# Patient Record
Sex: MALE | Race: WHITE | NOT HISPANIC OR LATINO | Employment: UNEMPLOYED | URBAN - METROPOLITAN AREA
[De-identification: names, ages, dates, MRNs, and addresses within clinical notes are randomized per-mention and may not be internally consistent; named-entity substitution may affect disease eponyms.]

---

## 2017-07-12 ENCOUNTER — HOSPITAL ENCOUNTER (EMERGENCY)
Facility: MEDICAL CENTER | Age: 62
End: 2017-07-12
Attending: EMERGENCY MEDICINE

## 2017-07-12 ENCOUNTER — APPOINTMENT (OUTPATIENT)
Dept: RADIOLOGY | Facility: MEDICAL CENTER | Age: 62
End: 2017-07-12
Attending: EMERGENCY MEDICINE

## 2017-07-12 VITALS
HEART RATE: 72 BPM | BODY MASS INDEX: 23.7 KG/M2 | SYSTOLIC BLOOD PRESSURE: 132 MMHG | RESPIRATION RATE: 20 BRPM | WEIGHT: 175 LBS | DIASTOLIC BLOOD PRESSURE: 81 MMHG | TEMPERATURE: 99.7 F | HEIGHT: 72 IN | OXYGEN SATURATION: 100 %

## 2017-07-12 DIAGNOSIS — J45.21 MILD INTERMITTENT ASTHMA WITH ACUTE EXACERBATION: ICD-10-CM

## 2017-07-12 LAB
ALBUMIN SERPL BCP-MCNC: 4.1 G/DL (ref 3.2–4.9)
ALBUMIN/GLOB SERPL: 2 G/DL
ALP SERPL-CCNC: 109 U/L (ref 30–99)
ALT SERPL-CCNC: 13 U/L (ref 2–50)
ANION GAP SERPL CALC-SCNC: 12 MMOL/L (ref 0–11.9)
AST SERPL-CCNC: 14 U/L (ref 12–45)
BASOPHILS # BLD AUTO: 0.6 % (ref 0–1.8)
BASOPHILS # BLD: 0.05 K/UL (ref 0–0.12)
BILIRUB SERPL-MCNC: 0.6 MG/DL (ref 0.1–1.5)
BUN SERPL-MCNC: 19 MG/DL (ref 8–22)
CALCIUM SERPL-MCNC: 9 MG/DL (ref 8.5–10.5)
CHLORIDE SERPL-SCNC: 108 MMOL/L (ref 96–112)
CO2 SERPL-SCNC: 19 MMOL/L (ref 20–33)
CREAT SERPL-MCNC: 0.88 MG/DL (ref 0.5–1.4)
EKG IMPRESSION: NORMAL
EOSINOPHIL # BLD AUTO: 0.55 K/UL (ref 0–0.51)
EOSINOPHIL NFR BLD: 6.5 % (ref 0–6.9)
ERYTHROCYTE [DISTWIDTH] IN BLOOD BY AUTOMATED COUNT: 38.9 FL (ref 35.9–50)
GFR SERPL CREATININE-BSD FRML MDRD: >60 ML/MIN/1.73 M 2
GLOBULIN SER CALC-MCNC: 2.1 G/DL (ref 1.9–3.5)
GLUCOSE SERPL-MCNC: 120 MG/DL (ref 65–99)
HCT VFR BLD AUTO: 40.5 % (ref 42–52)
HGB BLD-MCNC: 14 G/DL (ref 14–18)
IMM GRANULOCYTES # BLD AUTO: 0.02 K/UL (ref 0–0.11)
IMM GRANULOCYTES NFR BLD AUTO: 0.2 % (ref 0–0.9)
LACTATE BLD-SCNC: 1.7 MMOL/L (ref 0.5–2)
LYMPHOCYTES # BLD AUTO: 1.67 K/UL (ref 1–4.8)
LYMPHOCYTES NFR BLD: 19.8 % (ref 22–41)
MCH RBC QN AUTO: 29.9 PG (ref 27–33)
MCHC RBC AUTO-ENTMCNC: 34.6 G/DL (ref 33.7–35.3)
MCV RBC AUTO: 86.5 FL (ref 81.4–97.8)
MONOCYTES # BLD AUTO: 0.79 K/UL (ref 0–0.85)
MONOCYTES NFR BLD AUTO: 9.4 % (ref 0–13.4)
NEUTROPHILS # BLD AUTO: 5.34 K/UL (ref 1.82–7.42)
NEUTROPHILS NFR BLD: 63.5 % (ref 44–72)
NRBC # BLD AUTO: 0 K/UL
NRBC BLD AUTO-RTO: 0 /100 WBC
PLATELET # BLD AUTO: 253 K/UL (ref 164–446)
PMV BLD AUTO: 9.7 FL (ref 9–12.9)
POTASSIUM SERPL-SCNC: 3.5 MMOL/L (ref 3.6–5.5)
PROT SERPL-MCNC: 6.2 G/DL (ref 6–8.2)
RBC # BLD AUTO: 4.68 M/UL (ref 4.7–6.1)
SODIUM SERPL-SCNC: 139 MMOL/L (ref 135–145)
TROPONIN I SERPL-MCNC: <0.01 NG/ML (ref 0–0.04)
WBC # BLD AUTO: 8.4 K/UL (ref 4.8–10.8)

## 2017-07-12 PROCEDURE — 71010 DX-CHEST-PORTABLE (1 VIEW): CPT

## 2017-07-12 PROCEDURE — 93005 ELECTROCARDIOGRAM TRACING: CPT

## 2017-07-12 PROCEDURE — 94640 AIRWAY INHALATION TREATMENT: CPT

## 2017-07-12 PROCEDURE — 84484 ASSAY OF TROPONIN QUANT: CPT

## 2017-07-12 PROCEDURE — 700111 HCHG RX REV CODE 636 W/ 250 OVERRIDE (IP): Performed by: EMERGENCY MEDICINE

## 2017-07-12 PROCEDURE — 83605 ASSAY OF LACTIC ACID: CPT

## 2017-07-12 PROCEDURE — 99284 EMERGENCY DEPT VISIT MOD MDM: CPT

## 2017-07-12 PROCEDURE — 80053 COMPREHEN METABOLIC PANEL: CPT

## 2017-07-12 PROCEDURE — 96374 THER/PROPH/DIAG INJ IV PUSH: CPT

## 2017-07-12 PROCEDURE — 93005 ELECTROCARDIOGRAM TRACING: CPT | Performed by: EMERGENCY MEDICINE

## 2017-07-12 PROCEDURE — 700101 HCHG RX REV CODE 250: Performed by: EMERGENCY MEDICINE

## 2017-07-12 PROCEDURE — 85025 COMPLETE CBC W/AUTO DIFF WBC: CPT

## 2017-07-12 RX ORDER — PREDNISONE 20 MG/1
60 TABLET ORAL DAILY
Qty: 12 TAB | Refills: 0 | Status: SHIPPED | OUTPATIENT
Start: 2017-07-12 | End: 2017-07-16

## 2017-07-12 RX ORDER — LEVALBUTEROL INHALATION SOLUTION 1.25 MG/3ML
1.25 SOLUTION RESPIRATORY (INHALATION)
Status: COMPLETED | OUTPATIENT
Start: 2017-07-12 | End: 2017-07-12

## 2017-07-12 RX ORDER — ALBUTEROL SULFATE 90 UG/1
2 AEROSOL, METERED RESPIRATORY (INHALATION) EVERY 6 HOURS PRN
Qty: 8.5 G | Refills: 0 | Status: SHIPPED | OUTPATIENT
Start: 2017-07-12

## 2017-07-12 RX ORDER — METHYLPREDNISOLONE SODIUM SUCCINATE 125 MG/2ML
125 INJECTION, POWDER, LYOPHILIZED, FOR SOLUTION INTRAMUSCULAR; INTRAVENOUS ONCE
Status: COMPLETED | OUTPATIENT
Start: 2017-07-12 | End: 2017-07-12

## 2017-07-12 RX ADMIN — METHYLPREDNISOLONE SODIUM SUCCINATE 125 MG: 125 INJECTION, POWDER, FOR SOLUTION INTRAMUSCULAR; INTRAVENOUS at 19:06

## 2017-07-12 RX ADMIN — LEVALBUTEROL 1.25 MG: 1.25 SOLUTION RESPIRATORY (INHALATION) at 19:27

## 2017-07-12 ASSESSMENT — PAIN SCALES - GENERAL: PAINLEVEL_OUTOF10: 0

## 2017-07-12 NOTE — ED AVS SNAPSHOT
7/12/2017    Abilio Helm  [address] [place] [address]    Dear Abilio:    [facility] wants to ensure your discharge home is safe and you or your loved ones have had all of your questions answered regarding your care after you leave the hospital.    Below is a list of resources and contact information should you have any questions regarding your hospital stay, follow-up instructions, or active medical symptoms.    Questions or Concerns Regarding… Contact   Medical Questions Related to Your Discharge  (7 days a week, 8am-5pm) Contact a Nurse Care Coordinator   [phone]   Medical Questions Not Related to Your Discharge  (24 hours a day / 7 days a week)  Contact the Nurse Health Line   [phone]    Medications or Discharge Instructions Refer to your discharge packet   or contact your [facility] Primary Care Provider   [phone]   Follow-up Appointment(s) Schedule your appointment via [employer]   or contact Scheduling [phone]   Billing Review your statement via Should[employer] Options  or contact Billing [phone]   Medical Records Review your records via Should[employer] Options   or contact Medical Records [phone]     You may receive a telephone call within two days of discharge. This call is to make certain you understand your discharge instructions and have the opportunity to have any questions answered. You can also easily access your medical information, test results and upcoming appointments via the Shoulder Options free online health management tool. You can learn more and sign up at [employer]/Shoulder Options. For assistance setting up your Shoulder Options account, please call [phone].    Once again, we want to ensure your discharge home is safe and that you have a clear understanding of any next steps in your care. If you have any questions or concerns, please do not hesitate to contact us, we are here for you. Thank you for choosing [facility] for your healthcare needs.    Sincerely,    Your [facility] Healthcare Team

## 2017-07-12 NOTE — ED AVS SNAPSHOT
BlueVox Access Code: [email or web address]  Expires: 8/11/2017  9:47 PM    Your email address is not on file at [employer].  Email Addresses are required for you to sign up for BlueVox, please contact [phone] to verify your personal information and to provide your email address prior to attempting to register for BlueVox.    Abilio Helm  [address] [place] [address]    mktgt  A secure, online tool to manage your health information     [employer]’s BlueVox® is a secure, online tool that connects you to your personalized health information from the privacy of your home -- day or night - making it very easy for you to manage your healthcare. Once the activation process is completed, you can even access your medical information using the BlueVox bess, which is available for free in the Apple Bess store or Google Play store.     To learn more about BlueVox, visit [email or web address]    There are two levels of access available (as shown below):   My Chart Features  [facility] Primary Care Doctor [facility]  Specialists [facility]  Urgent  Care Non-[facility] Primary Care Doctor   Email your healthcare team securely and privately 24/7 X X X    Manage appointments: schedule your next appointment; view details of past/upcoming appointments X      Request prescription refills. X      View recent personal medical records, including lab and immunizations X X X X   View health record, including health history, allergies, medications X X X X   Read reports about your outpatient visits, procedures, consult and ER notes X X X X   See your discharge summary, which is a recap of your hospital and/or ER visit that includes your diagnosis, lab results, and care plan X X  X     How to register for mktgt:  Once your e-mail address has been verified, follow the following steps to sign up for mktgt.     1. Go to  [email or web address]  2. Click on the Sign Up Now box, which takes you to the New Member Sign Up page. You will need to  provide the following information:  a. Enter your [employer] Access Code exactly as it appears at the top of this page. (You will not need to use this code after you’ve completed the sign-up process. If you do not sign up before the expiration date, you must request a new code.)   b. Enter your date of birth.   c. Enter your home email address.   d. Click Submit, and follow the next screen’s instructions.  3. Create a [employer] ID. This will be your [employer] login ID and cannot be changed, so think of one that is secure and easy to remember.  4. Create a [employer] password. You can change your password at any time.  5. Enter your Password Reset Question and Answer. This can be used at a later time if you forget your password.   6. Enter your e-mail address. This allows you to receive e-mail notifications when new information is available in [employer].  7. Click Sign Up. You can now view your health information.    For assistance activating your [employer] account, call [phone]

## 2017-07-12 NOTE — ED AVS SNAPSHOT
Home Care Instructions                                                                                                                Abilio Helm   MRN: [id number]    Department:  [facility], Emergency Dept   Date of Visit:  7/12/2017            [facility], Emergency Dept    [address]    [place] [address]    Phone:  [phone]      You were seen by     Aquiles Quezada M.D.      Your Diagnosis Was     Mild intermittent asthma with acute exacerbation     J45.21       These are the medications you received during your hospitalization from 07/12/2017 1839 to 07/12/2017 2148     Date/Time Order Dose Route Action    07/12/2017 1906 methylPREDNISolone sod succ (SOLU-MEDROL) 125 MG injection 125 mg 125 mg Intravenous Given    07/12/2017 1927 levalbuterol (XOPENEX) 1.25 MG/3ML nebulizer solution 1.25 mg 1.25 mg Nebulization Given      Follow-up Information     1. Follow up with [facility].    Contact information    [facility]  [place] [address]  [phone]        Medication Information     Review all of your home medications and newly ordered medications with your primary doctor and/or pharmacist as soon as possible. Follow medication instructions as directed by your doctor and/or pharmacist.     Please keep your complete medication list with you and share with your physician. Update the information when medications are discontinued, doses are changed, or new medications (including over-the-counter products) are added; and carry medication information at all times in the event of emergency situations.               Medication List      START taking these medications        Instructions    Morning Afternoon Evening Bedtime    albuterol 108 (90 BASE) MCG/ACT Aers inhalation aerosol        Doctor's comments:  Use with spacer   Inhale 2 Puffs by mouth every 6 hours as needed for Shortness of Breath.   Dose:  2 Puff                        predniSONE 20 MG Tabs   Commonly  known as:  DELTASONE        Take 3 Tabs by mouth every day for 4 days.   Dose:  60 mg                             Where to Get Your Medications      You can get these medications from any pharmacy     Bring a paper prescription for each of these medications    - albuterol 108 (90 BASE) MCG/ACT Aers inhalation aerosol  - predniSONE 20 MG Tabs            Procedures and tests performed during your visit     CARDIAC MONITORING    CBC WITH DIFFERENTIAL    COMP METABOLIC PANEL    DX-CHEST-PORTABLE (1 VIEW)    EKG (ER)    EKG (NOW)    ESTIMATED GFR    IV Saline Lock    Lactic acid (lactate)    OXYGEN THERAPY PER PROTOCOL    TROPONIN        Discharge Instructions       Asthma, F.L.A.R.E.  Most people with asthma do not get so sick that they need emergency care. The fact that you had to get emergency care may mean:  · You are not taking your long-term control medicine the right way.   · You have not been prescribed any or enough long-term control medicine.   · You are still exposed to triggers that start your asthma symptoms.   You can avoid asthma flare-ups by using this F.L.A.R.E. plan until you see your primary doctor.  F  FOLLOW UP WITH YOUR PRIMARY DOCTOR - CALL TO MAKE AN APPOINTMENT TO BE SEEN.  · If you have trouble making an appointment, ask to speak to the office nurse.   · If you do not have a primary care doctor, call to get one.   At the follow-up appointment:  · Bring all of your medications and this plan with you.   · Make an asthma action plan with your doctor that you can follow every day to keep your asthma under control.   · Write down your questions and your doctor's answers.   This will make your emergency visits rare.  L  LEARN ABOUT YOUR ASTHMA MEDICINES. TAKE ALL OF THESE MEDICINES JUST AS THE DOCTOR TELLS YOU, EVEN IF YOU ARE FEELING MUCH BETTER.  Quick-relief or Rescue  · Kind of medicine   · Name of medicine   · How much   · How often & how long you need to take it   Long-term control  · Kind of  "medicine   · Name of medicine   · How much   · How often & how long you need to take it   Steroid pills or syrup  · Kind of medicine   · Name of medicine   · How much   · How often & how long you need to take it   A  ASTHMA IS A LIFE-LONG (CHRONIC) DISEASE.  Even though your breathing is better after getting emergency care, you still need to get long-term control of your asthma. If you do not, you are at risk for more severe flare-ups and even death.  · If you use quick-relief medicine more than 2 times per week, your asthma is not under control. You need to see your doctor or an asthma specialist to make a plan to get control of your asthma.   · Take long-term control medicine every day as ordered by your doctor.   · Figure out what things make your asthma flare up and try to stay away from these \"triggers.\"   R  RESPOND TO THESE WARNING SIGNS THAT YOUR ASTHMA IS GETTING WORSE:  · Your chest feels tight.   · You are short of breath.   · You are wheezing.   · You are coughing.   · Your peak flow is getting low.   Keep taking your medicines as prescribed and call your doctor.  E  EMERGENCY CARE MAY BE NEEDED IF YOU:  · Have trouble talking.   · Are working hard to breathe (may see skin sucking in at rib cage or above breast bone).   · Need to use quick-relief medicine more often than every 4 hours.   · See your peak flow dropping.   Take your quick-relief medicine and wait 20 minutes. If you do not feel better, take it again and wait 20 minutes. If you still do not feel better, take it again and call your doctor or 911 right away!  LEARN ABOUT ASTHMA  Asthma is a life-long disease that can make it hard for you to get air in and out of your lungs. Your asthma triggers make the air tubes in your lungs get smaller. These are the tubes that carry air in and out of your lungs.  Here is what happens:  · Small breathing tubes in your lungs swell and make extra mucus.   · Muscles around the small breathing tubes get tight " and make them smaller.   · Smaller breathing tubes then get clogged with the extra mucus.   · Swelling, muscle tightness, and mucus make it harder for you to breathe. You start to cough and wheeze and your chest might feel tight.   Not all asthma flare-ups are the same. Some are worse than others. In a severe asthma flare-up, the breathing tubes get so small that air cannot get in and out of the lungs. People can die if their asthma flare-up is severe.  ASTHMA MEDICINES  · Quick-relief or Rescue medicine: should help for about 4 hours, relaxes the muscles around your breathing tubes so air can get in and out. If you need to take quick-relief medicine more than 2 times per week, your asthma is not under control, and you should ask your doctor about long-term control medicine.   · Long-term control medicine: must be taken every day in order to work right. It keeps your breathing tubes from swelling, and can prevent most asthma flare-ups. This medicine cannot stop a flare-up once it starts. During flare-ups, use quick-relief medicine right away and take your long-term control medicine as usual.   · Steroid pills or syrup: can help the swelling in your breathing tubes go away. You must take this medicine just as the doctor tells you to. Do not skip a dose, and do not stop taking it unless a doctor tells you to stop.   TRIGGERS: TELL YOUR DOCTOR ABOUT THE THINGS THAT MAKE YOUR ASTHMA WORSE.  What started or triggered your asthma flare-up this time?  COMMON ASTHMA TRIGGERS:  · Breathing in chemicals, dusts, or fumes at work.   · Colds or flu.   · Animals.   · Dust.   · Pollen and mold.   · Strong odors.   · Weather.   · Exercise.   · Cigarette and other smoke.   · Medicines.   Smoking and secondhand smoke are asthma triggers. If you smoke, choose to quit. Never let others smoke near you or your children. Call your doctor or your health plan for help quitting.  FOR MORE INFORMATION  Asthma Initiative of [place]:  www.GetAsthmaHelp.org  American Lung Association: www.lungusa.org  Asthma and Allergy Foundation of [place]: www.aafa.org  This plan and asthma information are based on the NAEPP Guidelines for the Diagnosis and Management of Asthma, (http://www.nhlbi.nig.gov/guidelines/asthma/asthgdln.htm).  Document Released: 07/26/2007 Document Revised: 03/11/2013 Document Reviewed: 10/04/2007  ExitCare® Patient Information ©2013 [employer].          Patient Information     Patient Information    Following emergency treatment: all patient requiring follow-up care must return either to a private physician or a clinic if your condition worsens before you are able to obtain further medical attention, please return to the emergency room.     Billing Information    At [facility], we work to make the billing process streamlined for our patients.  Our Representatives are here to answer any questions you may have regarding your hospital bill.  If you have insurance coverage and have supplied your insurance information to us, we will submit a claim to your insurer on your behalf.  Should you have any questions regarding your bill, we can be reached online or by phone as follows:  Online: You are able pay your bills online or live chat with our representatives about any billing questions you may have. We are here to help Monday - Friday from 8:00am to 7:30pm and 9:00am - 12:00pm on Saturdays.  Please visit [email or web address]  for more information.   Phone:  [phone] or [phone]    Please note that your emergency physician, surgeon, pathologist, radiologist, anesthesiologist, and other specialists are not employed by [facility] and will therefore bill separately for their services.  Please contact them directly for any questions concerning their bills at the numbers below:     Emergency Physician Services:  [phone]  [facility]:  [phone]  [facility]:   [phone]  [facility]:  [phone]    1. Your final bill may vary from the amount quoted upon discharge if all procedures are not complete at that time, or if your doctor has additional procedures of which we are not aware. You will receive an additional bill if you return to the Emergency Department at [facility] for suture removal regardless of the facility of which the sutures were placed.     2. Please arrange for settlement of this account at the emergency registration.    3. All self-pay accounts are due in full at the time of treatment.  If you are unable to meet this obligation then payment is expected within 4-5 days.     4. If you have had radiology studies (CT, X-ray, Ultrasound, MRI), you have received a preliminary result during your emergency department visit. Please contact the radiology department [phone] to receive a copy of your final result. Please discuss the Final result with your primary physician or with the follow up physician provided.     Crisis Hotline:  [place] Crisis Hotline:  7-857-EIWRNOQ or 1-238.224.1764  [place] Crisis Hotline:    1-337.700.6055 or 122-497-0834         ED Discharge Follow Up Questions    1. In order to provide you with very good care, we would like to follow up with a phone call in the next few days.  May we have your permission to contact you?     YES /  NO    2. What is the best phone number to call you? (       )_____-__________    3. What is the best time to call you?      Morning  /  Afternoon  /  Evening                   Patient Signature:  ____________________________________________________________    Date:  ____________________________________________________________

## 2017-07-13 ENCOUNTER — PATIENT OUTREACH (OUTPATIENT)
Dept: HEALTH INFORMATION MANAGEMENT | Facility: OTHER | Age: 62
End: 2017-07-13

## 2017-07-13 NOTE — PROGRESS NOTES
· Chart reviewed.  Patient was discharged from [facility] ER 7/12/17.  Patient does not have a contact phone number listed in Epic record.  Discharge outreach letter mailed to patient, providing resource phone numbers and instructions to call with any questions or concerns.

## 2017-07-13 NOTE — ED NOTES
Respiratory contacted for breathing tx, with a critical patient intubating will come after that to give breathing tx

## 2017-07-13 NOTE — ED PROVIDER NOTES
ED Provider Note    Scribed for Aquiles Quezada M.D. by Aida Saldana. 7/12/2017, 6:53 PM.    Primary care provider: No primary care provider.  Means of arrival: Ambulance   History obtained from: Patient  History limited by: None     CHIEF COMPLAINT  Chief Complaint   Patient presents with   • Shortness of Breath     pt with asthma attack due to smoke in air, states last attack was in mid 1980, states was walking around outside recently, wheezing noted throughout, ems gave one albuterol and one duoneb on arrival, roomair sat 85% recorded by fire dept, now 98% on        HPI  Abilio Helm is a 61 y.o. male who presents to the Emergency Department for shortness of breath onset yesterday and worsening this afternoon. He states that he has a history of asthma but has not had any problems with this since the 1980's. He does not take medications for asthma or any other medications. Per patient, his current symptoms feel similar to previous asthma attacks. The patient reports that he was walking outside in the smoke quite a bit in the past several days. He denies a known history of COPD. Patient denies fevers, chills, nausea, vomiting, diarrhea, chest pain. He states that he got laid off at [employer] a few months ago and has been [facility]/staying with friends.     REVIEW OF SYSTEMS  See HPI for further details. All other systems are negative. C    PAST MEDICAL HISTORY   has a past medical history of Asthma.    SURGICAL HISTORY   has past surgical history that includes other abdominal surgery.    SOCIAL HISTORY  Social History   Substance Use Topics   • Smoking status: Former Smoker   • Smokeless tobacco: None   • Alcohol Use: No      History   Drug Use No       FAMILY HISTORY  History reviewed. No pertinent family history.    CURRENT MEDICATIONS  Reviewed.  See Encounter Summary.     ALLERGIES  No Known Allergies    PHYSICAL EXAM  VITAL SIGNS: /81 mmHg  Pulse 103  Temp(Src) 37.6 °C (99.7 °F)   Resp 24  Ht 1.829 m (6')  Wt 79.379 kg (175 lb)  BMI 23.73 kg/m2  SpO2 99%    Constitutional: Alert in no apparent distress.  HENT: No signs of trauma, Bilateral external ears normal, Nose normal.   Eyes: Pupils are equal and reactive, Conjunctiva normal, Non-icteric.   Neck: Normal range of motion, No tenderness, Supple, No stridor.   Lymphatic: No lymphadenopathy noted.   Cardiovascular: Regular rate and rhythm, no murmurs.   Thorax & Lungs: Tachypneic. Wheezes throughout.   Abdomen: Slightly scaphoid. Bowel sounds normal, Soft, No tenderness, No masses, No pulsatile masses. No peritoneal signs.  Skin: Warm, Dry, No erythema, No rash.   Back: No bony tenderness, No CVA tenderness.   Extremities: Intact distal pulses, No edema, No tenderness, No cyanosis  Musculoskeletal: Good range of motion in all major joints. No tenderness to palpation or major deformities noted.   Neurologic: Alert, Normal motor function, Normal sensory function, No focal deficits noted.   Psychiatric: Affect normal, Judgment normal, Mood normal.     DIAGNOSTIC STUDIES / PROCEDURES     LABS  Results for orders placed or performed during the hospital encounter of 07/12/17   Lactic acid (lactate)   Result Value Ref Range    Lactic Acid 1.7 0.5 - 2.0 mmol/L   CBC WITH DIFFERENTIAL   Result Value Ref Range    WBC 8.4 4.8 - 10.8 K/uL    RBC 4.68 (L) 4.70 - 6.10 M/uL    Hemoglobin 14.0 14.0 - 18.0 g/dL    Hematocrit 40.5 (L) 42.0 - 52.0 %    MCV 86.5 81.4 - 97.8 fL    MCH 29.9 27.0 - 33.0 pg    MCHC 34.6 33.7 - 35.3 g/dL    RDW 38.9 35.9 - 50.0 fL    Platelet Count 253 164 - 446 K/uL    MPV 9.7 9.0 - 12.9 fL    Neutrophils-Polys 63.50 44.00 - 72.00 %    Lymphocytes 19.80 (L) 22.00 - 41.00 %    Monocytes 9.40 0.00 - 13.40 %    Eosinophils 6.50 0.00 - 6.90 %    Basophils 0.60 0.00 - 1.80 %    Immature Granulocytes 0.20 0.00 - 0.90 %    Nucleated RBC 0.00 /100 WBC    Neutrophils (Absolute) 5.34 1.82 - 7.42 K/uL    Lymphs (Absolute) 1.67 1.00 - 4.80  K/uL    Monos (Absolute) 0.79 0.00 - 0.85 K/uL    Eos (Absolute) 0.55 (H) 0.00 - 0.51 K/uL    Baso (Absolute) 0.05 0.00 - 0.12 K/uL    Immature Granulocytes (abs) 0.02 0.00 - 0.11 K/uL    NRBC (Absolute) 0.00 K/uL   COMP METABOLIC PANEL   Result Value Ref Range    Sodium 139 135 - 145 mmol/L    Potassium 3.5 (L) 3.6 - 5.5 mmol/L    Chloride 108 96 - 112 mmol/L    Co2 19 (L) 20 - 33 mmol/L    Anion Gap 12.0 (H) 0.0 - 11.9    Glucose 120 (H) 65 - 99 mg/dL    Bun 19 8 - 22 mg/dL    Creatinine 0.88 0.50 - 1.40 mg/dL    Calcium 9.0 8.5 - 10.5 mg/dL    AST(SGOT) 14 12 - 45 U/L    ALT(SGPT) 13 2 - 50 U/L    Alkaline Phosphatase 109 (H) 30 - 99 U/L    Total Bilirubin 0.6 0.1 - 1.5 mg/dL    Albumin 4.1 3.2 - 4.9 g/dL    Total Protein 6.2 6.0 - 8.2 g/dL    Globulin 2.1 1.9 - 3.5 g/dL    A-G Ratio 2.0 g/dL   ESTIMATED GFR   Result Value Ref Range    GFR If African American >60 >60 mL/min/1.73 m 2    GFR If Non African American >60 >60 mL/min/1.73 m 2   TROPONIN   Result Value Ref Range    Troponin I <0.01 0.00 - 0.04 ng/mL   EKG (ER)   Result Value Ref Range    Report       [facility] Emergency Dept.    Test Date:  2017  Pt Name:    MILES CARCAMO                Department: ER  MRN:        [id number]                      Room:        19  Gender:     M                            Technician: [id number]  :        1955                   Requested By:ER TRIAGE PROTOCOL  Order #:    [id number]                    Reading MD:    Measurements  Intervals                                Axis  Rate:       98                           P:          70  GA:         152                          QRS:        34  QRSD:       86                           T:          76  QT:         380  QTc:        486    Interpretive Statements  SINUS RHYTHM  RSR' IN V1 OR V2, PROBABLY NORMAL VARIANT  BORDERLINE T ABNORMALITIES, ANT-LAT LEADS  BORDERLINE PROLONGED QT INTERVAL  No previous ECG available for comparison     All labs were  reviewed by me.    EKG  12 Lead EKG interpreted by me to show:  Sinus rhythm  Rate 98  Axis: Borderline left axis deviation   QTc of 46  No acute ST-T wave changes    RADIOLOGY  DX-CHEST-PORTABLE (1 VIEW)   Final Result      Bilateral lung hyperinflation. Bleb formation in the mid left lung. No evidence for pneumonia or congestive heart failure.      The radiologist's interpretation of all radiological studies and images have been reviewed by me.    COURSE & MEDICAL DECISION MAKING  Pertinent Labs & Imaging studies reviewed. (See chart for details)      6:53 PM - Patient seen and examined at bedside. Patient will be treated with 125 mg Solu-Medrol IV, 1.25 mg Xopenex via nebulizer. Ordered for a chest XR, EKG, and labs to evaluate his symptoms. The plan of care was discussed with the patient and I answered all of his questions at this time. The patient understands and is agreeable with this plan of care.      9:04 PM Patient reevaluated at bedside. He reports feeling improved following interventions. The patient will be prescribed an albuterol inhaler. Discussed that he is to use this every 4-6 hours for the next several days. He will also be prescribed Deltasone. Discussed plan for discharge; I advised the patient to return to the [facility] ED with any new or worsening symptoms. Patient was given the opportunity for questions. I addressed all questions or concerns at this time and they verbalize agreement to the plan of care.     Decision Making:  This is a 61 y.o. year old male who presents with shortness of breath. Patient currently homeless. Living on the streets and in a homeless shelter. History of asthma. Likely asthma flare given his presentation today. Significant improvement with albuterol and Xopenex. We'll give him outpatient albuterol inhaler and ongoing steroids. He does have he needs to stop smoking. He is satting return precautions to ER if needed. He has been referred to local clinic for outpatient  follow-up.    The patient will return for new or worsening symptoms and is stable at the time of discharge.    The patient is referred to a primary physician for blood pressure management, diabetic screening, and for all other preventative health concerns.    DISPOSITION:  Patient will be discharged home in stable condition.    OUTPATIENT MEDICATIONS:  New Prescriptions    ALBUTEROL 108 (90 BASE) MCG/ACT AERO SOLN INHALATION AEROSOL    Inhale 2 Puffs by mouth every 6 hours as needed for Shortness of Breath.    PREDNISONE (DELTASONE) 20 MG TAB    Take 3 Tabs by mouth every day for 4 days.     FINAL IMPRESSION  1. Mild intermittent asthma with acute exacerbation       Aida Saldana (Scribe), am scribing for, and in the presence of, Aquiles Quezada M.D..    Electronically signed by: Aida Saldana ([employer]), 7/12/2017    IAquiles M.D. personally performed the services described in this documentation, as scribed by Aida Saldana in my presence, and it is both accurate and complete.    The note accurately reflects work and decisions made by me.  Aquiles Quezada  7/13/2017  1:25 AM

## 2017-07-13 NOTE — Clinical Note
July 13, 2017    Abilio Helm  [address] [place] [address]    Dear Abilio:    An attempt was made to contact you after your recent hospital discharge from [facility], but we were unable to reach you.    Below is a list of resources and contact information should you have any questions regarding your hospital stay, follow-up instructions or active medical symptoms.     Questions/concerns regarding:    Medical questions related to your discharge ..............Contact a Nurse Care Coordinator   (7 days a week, 8am-5pm)                                          At [phone]                                                                                                                                                                           Medical questions not related to your recent discharge......Contact the [employer]       (24 hours a day/7 days a week)                                        Line at  [phone]          Medications or Discharge Instructions......Refer to your discharge packet or contact your                                                                    [facility] Primary Care Provider                                                                               [phone]    Follow-up appointment..............................Schedule your appt via [employer] or contact                                                                           scheduling at [phone]    Billing.........................................................Review your statement via [employer] or contact                                                                     Billing at [phone]    Medical Records........................................Review your records via [employer] or contact                                                                        Medical Records at [phone]    If you have any questions or concerns, please do not hesitate to contact us. Thank you for choosing [facility] for your healthcare  needs.     Sincerely,    Your [facility] Team

## 2017-07-13 NOTE — ED NOTES
.  Chief Complaint   Patient presents with   • Shortness of Breath     pt with asthma attack due to smoke in air, states last attack was in mid 1980, states was walking around outside recently, wheezing noted throughout, ems gave one albuterol and one duoneb on arrival, roomair sat 85% recorded by fire dept, now 98% on

## 2017-07-13 NOTE — DISCHARGE INSTRUCTIONS
Asthma, F.L.A.R.E.  Most people with asthma do not get so sick that they need emergency care. The fact that you had to get emergency care may mean:  · You are not taking your long-term control medicine the right way.   · You have not been prescribed any or enough long-term control medicine.   · You are still exposed to triggers that start your asthma symptoms.   You can avoid asthma flare-ups by using this F.L.A.R.E. plan until you see your primary doctor.  F  FOLLOW UP WITH YOUR PRIMARY DOCTOR - CALL TO MAKE AN APPOINTMENT TO BE SEEN.  · If you have trouble making an appointment, ask to speak to the office nurse.   · If you do not have a primary care doctor, call to get one.   At the follow-up appointment:  · Bring all of your medications and this plan with you.   · Make an asthma action plan with your doctor that you can follow every day to keep your asthma under control.   · Write down your questions and your doctor's answers.   This will make your emergency visits rare.  L  LEARN ABOUT YOUR ASTHMA MEDICINES. TAKE ALL OF THESE MEDICINES JUST AS THE DOCTOR TELLS YOU, EVEN IF YOU ARE FEELING MUCH BETTER.  Quick-relief or Rescue  · Kind of medicine   · Name of medicine   · How much   · How often & how long you need to take it   Long-term control  · Kind of medicine   · Name of medicine   · How much   · How often & how long you need to take it   Steroid pills or syrup  · Kind of medicine   · Name of medicine   · How much   · How often & how long you need to take it   A  ASTHMA IS A LIFE-LONG (CHRONIC) DISEASE.  Even though your breathing is better after getting emergency care, you still need to get long-term control of your asthma. If you do not, you are at risk for more severe flare-ups and even death.  · If you use quick-relief medicine more than 2 times per week, your asthma is not under control. You need to see your doctor or an asthma specialist to make a plan to get control of your asthma.   · Take long-term  "control medicine every day as ordered by your doctor.   · Figure out what things make your asthma flare up and try to stay away from these \"triggers.\"   R  RESPOND TO THESE WARNING SIGNS THAT YOUR ASTHMA IS GETTING WORSE:  · Your chest feels tight.   · You are short of breath.   · You are wheezing.   · You are coughing.   · Your peak flow is getting low.   Keep taking your medicines as prescribed and call your doctor.  E  EMERGENCY CARE MAY BE NEEDED IF YOU:  · Have trouble talking.   · Are working hard to breathe (may see skin sucking in at rib cage or above breast bone).   · Need to use quick-relief medicine more often than every 4 hours.   · See your peak flow dropping.   Take your quick-relief medicine and wait 20 minutes. If you do not feel better, take it again and wait 20 minutes. If you still do not feel better, take it again and call your doctor or 911 right away!  LEARN ABOUT ASTHMA  Asthma is a life-long disease that can make it hard for you to get air in and out of your lungs. Your asthma triggers make the air tubes in your lungs get smaller. These are the tubes that carry air in and out of your lungs.  Here is what happens:  · Small breathing tubes in your lungs swell and make extra mucus.   · Muscles around the small breathing tubes get tight and make them smaller.   · Smaller breathing tubes then get clogged with the extra mucus.   · Swelling, muscle tightness, and mucus make it harder for you to breathe. You start to cough and wheeze and your chest might feel tight.   Not all asthma flare-ups are the same. Some are worse than others. In a severe asthma flare-up, the breathing tubes get so small that air cannot get in and out of the lungs. People can die if their asthma flare-up is severe.  ASTHMA MEDICINES  · Quick-relief or Rescue medicine: should help for about 4 hours, relaxes the muscles around your breathing tubes so air can get in and out. If you need to take quick-relief medicine more than 2 " times per week, your asthma is not under control, and you should ask your doctor about long-term control medicine.   · Long-term control medicine: must be taken every day in order to work right. It keeps your breathing tubes from swelling, and can prevent most asthma flare-ups. This medicine cannot stop a flare-up once it starts. During flare-ups, use quick-relief medicine right away and take your long-term control medicine as usual.   · Steroid pills or syrup: can help the swelling in your breathing tubes go away. You must take this medicine just as the doctor tells you to. Do not skip a dose, and do not stop taking it unless a doctor tells you to stop.   TRIGGERS: TELL YOUR DOCTOR ABOUT THE THINGS THAT MAKE YOUR ASTHMA WORSE.  What started or triggered your asthma flare-up this time?  COMMON ASTHMA TRIGGERS:  · Breathing in chemicals, dusts, or fumes at work.   · Colds or flu.   · Animals.   · Dust.   · Pollen and mold.   · Strong odors.   · Weather.   · Exercise.   · Cigarette and other smoke.   · Medicines.   Smoking and secondhand smoke are asthma triggers. If you smoke, choose to quit. Never let others smoke near you or your children. Call your doctor or your health plan for help quitting.  FOR MORE INFORMATION  Asthma Initiative of [place]: www.GetAsthmaHelp.org  American Lung Association: www.lungusa.org  Asthma and Allergy Foundation of [place]: www.aafa.org  This plan and asthma information are based on the NAEPP Guidelines for the Diagnosis and Management of Asthma, (http://www.nhlbi.nig.gov/guidelines/asthma/asthgdln.htm).  Document Released: 07/26/2007 Document Revised: 03/11/2013 Document Reviewed: 10/04/2007  [employer]® Patient Information ©2013 [employer].

## 2021-03-03 DIAGNOSIS — Z23 NEED FOR VACCINATION: ICD-10-CM
